# Patient Record
Sex: FEMALE
[De-identification: names, ages, dates, MRNs, and addresses within clinical notes are randomized per-mention and may not be internally consistent; named-entity substitution may affect disease eponyms.]

---

## 2024-06-10 ENCOUNTER — DASHBOARD ENCOUNTERS (OUTPATIENT)
Age: 44
End: 2024-06-10

## 2024-06-10 ENCOUNTER — OFFICE VISIT (OUTPATIENT)
Dept: URBAN - METROPOLITAN AREA CLINIC 40 | Facility: CLINIC | Age: 44
End: 2024-06-10
Payer: COMMERCIAL

## 2024-06-10 ENCOUNTER — OFFICE VISIT (OUTPATIENT)
Dept: URBAN - METROPOLITAN AREA CLINIC 94 | Facility: CLINIC | Age: 44
End: 2024-06-10

## 2024-06-10 ENCOUNTER — LAB OUTSIDE AN ENCOUNTER (OUTPATIENT)
Dept: URBAN - METROPOLITAN AREA CLINIC 40 | Facility: CLINIC | Age: 44
End: 2024-06-10

## 2024-06-10 VITALS
TEMPERATURE: 97.9 F | SYSTOLIC BLOOD PRESSURE: 120 MMHG | BODY MASS INDEX: 24.95 KG/M2 | HEIGHT: 63 IN | HEART RATE: 67 BPM | WEIGHT: 140.8 LBS | DIASTOLIC BLOOD PRESSURE: 80 MMHG

## 2024-06-10 DIAGNOSIS — K21.9 GERD: ICD-10-CM

## 2024-06-10 DIAGNOSIS — Z12.11 COLON CANCER SCREENING: ICD-10-CM

## 2024-06-10 DIAGNOSIS — K59.00 CONSTIPATION, UNSPECIFIED: ICD-10-CM

## 2024-06-10 PROCEDURE — 99203 OFFICE O/P NEW LOW 30 MIN: CPT | Performed by: INTERNAL MEDICINE

## 2024-06-10 RX ORDER — POLYETHYLENE GLYCOL 3350, SODIUM SULFATE, SODIUM CHLORIDE, POTASSIUM CHLORIDE, ASCORBIC ACID, SODIUM ASCORBATE 140-9-5.2G
1000 ML KIT ORAL ONCE
Qty: 1 | Refills: 0 | OUTPATIENT
Start: 2024-06-10 | End: 2024-06-11

## 2024-06-10 RX ORDER — FAMOTIDINE 40 MG/1
1 TABLET AT BEDTIME TABLET, FILM COATED ORAL ONCE A DAY
Qty: 90 TABLET | Refills: 0 | OUTPATIENT
Start: 2024-06-10

## 2024-06-10 NOTE — HPI-TODAY'S VISIT:
Ms. Friedman is a 44-year-old  female presenting for new patient evaluation for colon cancer screening as well as abdominal pain, reflux and change in bowel habits.  Patient is Japanese-speaking and so visit was assisted with use of  via tablet.  Patient states this will be her first colonoscopy.  There is no known family history of colon cancer or polyps.  She moves her bowels usually daily but notes that 2-3 times a week the stools are hard.  She states that it can be painful to pass the stool.  She is not had any rectal bleeding.  She notes bilateral lower quadrant abdominal pain that is a pressure crampy sensation.  The pain does seem to relieve when she does move her bowels.  She denies any nausea or vomiting.  She does have reflux on a daily basis.  She wakes up with it.  She has not tried anything over-the-counter except probiotic based therapies like kombucha.  She denies any dysphagia.  She denies any unintentional weight loss.  She uses NSAIDs but only around her menstrual period once a month.  She has noted some increased gas.  Diet she admits to some indiscretions with tomatoes, onions, garlic and caffeine.

## 2024-07-05 ENCOUNTER — OFFICE VISIT (OUTPATIENT)
Dept: URBAN - METROPOLITAN AREA SURGERY CENTER 30 | Facility: SURGERY CENTER | Age: 44
End: 2024-07-05

## 2024-07-11 ENCOUNTER — CLAIMS CREATED FROM THE CLAIM WINDOW (OUTPATIENT)
Dept: URBAN - METROPOLITAN AREA SURGERY CENTER 30 | Facility: SURGERY CENTER | Age: 44
End: 2024-07-11
Payer: COMMERCIAL

## 2024-07-11 DIAGNOSIS — K64.4 INTERNAL AND EXTERNAL HEMORRHOIDS WITHOUT COMPLICATION: ICD-10-CM

## 2024-07-11 DIAGNOSIS — Z12.11 COLON CANCER SCREENING: ICD-10-CM

## 2024-07-11 DIAGNOSIS — K57.30 DIVERTICULA OF COLON: ICD-10-CM

## 2024-07-11 PROCEDURE — G0121 COLON CA SCRN NOT HI RSK IND: HCPCS | Performed by: INTERNAL MEDICINE

## 2024-07-11 PROCEDURE — 0528F RCMND FLW-UP 10 YRS DOCD: CPT | Performed by: INTERNAL MEDICINE

## 2024-07-11 PROCEDURE — 00812 ANES LWR INTST SCR COLSC: CPT | Performed by: NURSE ANESTHETIST, CERTIFIED REGISTERED

## 2024-08-07 ENCOUNTER — OFFICE VISIT (OUTPATIENT)
Dept: URBAN - METROPOLITAN AREA CLINIC 40 | Facility: CLINIC | Age: 44
End: 2024-08-07
Payer: COMMERCIAL

## 2024-08-07 VITALS
HEIGHT: 63 IN | TEMPERATURE: 98.8 F | BODY MASS INDEX: 24.27 KG/M2 | DIASTOLIC BLOOD PRESSURE: 70 MMHG | WEIGHT: 137 LBS | HEART RATE: 69 BPM | SYSTOLIC BLOOD PRESSURE: 130 MMHG

## 2024-08-07 DIAGNOSIS — K64.8 EXTERNAL HEMORRHOIDS: ICD-10-CM

## 2024-08-07 DIAGNOSIS — R10.13 DYSPEPSIA: ICD-10-CM

## 2024-08-07 DIAGNOSIS — K57.30 DIVERTICULA OF COLON: ICD-10-CM

## 2024-08-07 DIAGNOSIS — Z86.19 HISTORY OF HELICOBACTER INFECTION: ICD-10-CM

## 2024-08-07 PROCEDURE — 99214 OFFICE O/P EST MOD 30 MIN: CPT | Performed by: PHYSICIAN ASSISTANT

## 2024-08-07 RX ORDER — FAMOTIDINE 40 MG/1
1 TABLET AT BEDTIME TABLET, FILM COATED ORAL ONCE A DAY
Qty: 90 TABLET | Refills: 0 | Status: ON HOLD | COMMUNITY
Start: 2024-06-10

## 2024-08-07 NOTE — HPI-TODAY'S VISIT:
Ms. Friedman is a 44-year-old  female seen 6/10/24, to schedule colon cancer screening as well as abdominal pain, reflux and change in bowel habits. There is no known family history of colon cancer or polyps.  She moves her bowels usually daily but notes that 2-3 times a week the stools are hard.  She states that it can be painful to pass the stool.  She is not had any rectal bleeding.  She does have reflux on a daily basis.  She wakes up with it.  She has not tried anything over-the-counter except probiotic based therapies like kombucha.  She denies any dysphagia.  She denies any unintentional weight loss.  She uses NSAIDs but only around her menstrual period once a month.  She has noted some increased gas.  Diet she admits to some indiscretions with tomatoes, onions, garlic and caffeine. Famotidine 40mg prescribed last visit as well as Miralax 17g daily. Patient had a normal colonoscopy July 11, 2024.  No evidence of polyps. [id number] screening recommended.  Diverticula noted of the left colon as well as nonbleeding internal and external hemorrhoids The patient is not currently taking famotidine and still has some dyspeptic symptoms without nausea, vomiting or dysphagia.  Good appetite and weight stable.  Her stool consistency will alternate based on her diet and she has eliminated dairy, now doing almond milk.  She feels better using this.  Also, she is not using MiraLAX as prescribed last visit, denying any current constipation.  No rectal bleeding or melena.  States that she was treated for H. pylori infection several years ago but does not recall having testing to confirm eradication.  She wonders if her current symptoms may be consistent with persistent or refractory infection.

## 2024-08-29 LAB — H PYLORI BREATH TEST: NOT DETECTED

## 2024-09-30 ENCOUNTER — ERX REFILL RESPONSE (OUTPATIENT)
Dept: URBAN - METROPOLITAN AREA CLINIC 40 | Facility: CLINIC | Age: 44
End: 2024-09-30

## 2024-09-30 RX ORDER — FAMOTIDINE 40 MG/1
1 TABLET AT BEDTIME TABLET, FILM COATED ORAL ONCE A DAY
Qty: 90 TABLET | Refills: 0 | OUTPATIENT

## 2024-09-30 RX ORDER — FAMOTIDINE 40 MG/1
TAKE 1 TABLET BY MOUTH EVERY DAY AT BEDTIME FOR 90 DAYS TABLET, FILM COATED ORAL
Qty: 90 TABLET | Refills: 0 | OUTPATIENT

## 2025-02-07 ENCOUNTER — OFFICE VISIT (OUTPATIENT)
Dept: URBAN - METROPOLITAN AREA CLINIC 40 | Facility: CLINIC | Age: 45
End: 2025-02-07